# Patient Record
Sex: FEMALE | URBAN - METROPOLITAN AREA
[De-identification: names, ages, dates, MRNs, and addresses within clinical notes are randomized per-mention and may not be internally consistent; named-entity substitution may affect disease eponyms.]

---

## 2021-06-01 ENCOUNTER — APPOINTMENT (OUTPATIENT)
Dept: URBAN - METROPOLITAN AREA CLINIC 320 | Age: 51
Setting detail: DERMATOLOGY
End: 2021-06-03

## 2021-06-01 DIAGNOSIS — Z41.9 ENCOUNTER FOR PROCEDURE FOR PURPOSES OTHER THAN REMEDYING HEALTH STATE, UNSPECIFIED: ICD-10-CM

## 2021-06-01 PROCEDURE — OTHER TEOSYAL RHA 2 INJECTION: OTHER

## 2021-06-01 PROCEDURE — OTHER PRESCRIPTION: OTHER

## 2021-06-01 RX ORDER — VALACYCLOVIR HYDROCHLORIDE 500 MG/1
500MG TABLET, FILM COATED ORAL
Qty: 10 | Refills: 1 | COMMUNITY
Start: 2021-06-01

## 2021-06-03 ENCOUNTER — APPOINTMENT (OUTPATIENT)
Dept: URBAN - METROPOLITAN AREA CLINIC 320 | Age: 51
Setting detail: DERMATOLOGY
End: 2021-06-03

## 2021-06-03 DIAGNOSIS — Z41.9 ENCOUNTER FOR PROCEDURE FOR PURPOSES OTHER THAN REMEDYING HEALTH STATE, UNSPECIFIED: ICD-10-CM

## 2021-06-03 PROCEDURE — OTHER COSMETIC FOLLOW-UP: OTHER

## 2021-06-03 NOTE — PROCEDURE: COSMETIC FOLLOW-UP
Detail Level: Detailed
Price (Use Numbers Only, No Special Characters Or $): 0
Comments (Free Text): Patient was treated 2 days ago with RHA2 on lips. Patient verbalized that her right upper lip is slightly larger than her left upper.\\n\\nPost treatment photos taken and reviewed the right upper lips is slightly different from the left. I massaged all treated sites. Explained to patient to wait one week and retake photos may need more product. However the slight difference may not make a big difference if that’s the way her lips are originally.\\n\\nPatient verbalized understanding.

## 2021-06-10 ENCOUNTER — APPOINTMENT (OUTPATIENT)
Dept: URBAN - METROPOLITAN AREA CLINIC 320 | Age: 51
Setting detail: DERMATOLOGY
End: 2021-06-17

## 2021-06-10 DIAGNOSIS — Z41.9 ENCOUNTER FOR PROCEDURE FOR PURPOSES OTHER THAN REMEDYING HEALTH STATE, UNSPECIFIED: ICD-10-CM

## 2021-06-10 PROCEDURE — OTHER TEOSYAL RHA 2 INJECTION: OTHER

## 2021-06-15 ENCOUNTER — APPOINTMENT (OUTPATIENT)
Dept: URBAN - METROPOLITAN AREA CLINIC 320 | Age: 51
Setting detail: DERMATOLOGY
End: 2021-06-17

## 2021-06-15 DIAGNOSIS — Z41.9 ENCOUNTER FOR PROCEDURE FOR PURPOSES OTHER THAN REMEDYING HEALTH STATE, UNSPECIFIED: ICD-10-CM

## 2021-06-15 PROCEDURE — OTHER TEOSYAL RHA 2 INJECTION: OTHER

## 2021-06-15 ASSESSMENT — LOCATION DETAILED DESCRIPTION DERM: LOCATION DETAILED: RIGHT FOREHEAD

## 2021-06-15 ASSESSMENT — LOCATION SIMPLE DESCRIPTION DERM: LOCATION SIMPLE: RIGHT FOREHEAD

## 2021-06-15 ASSESSMENT — LOCATION ZONE DERM: LOCATION ZONE: FACE

## 2022-06-24 ENCOUNTER — APPOINTMENT (OUTPATIENT)
Dept: URBAN - METROPOLITAN AREA CLINIC 320 | Age: 52
Setting detail: DERMATOLOGY
End: 2022-06-24

## 2022-06-24 DIAGNOSIS — Z41.9 ENCOUNTER FOR PROCEDURE FOR PURPOSES OTHER THAN REMEDYING HEALTH STATE, UNSPECIFIED: ICD-10-CM

## 2022-06-24 PROCEDURE — OTHER FILLERS: OTHER

## 2022-06-24 PROCEDURE — OTHER PHOTO-DOCUMENTATION: OTHER

## 2022-06-24 PROCEDURE — OTHER PRESCRIPTION: OTHER

## 2022-06-24 PROCEDURE — OTHER ADDITIONAL NOTES: OTHER

## 2022-06-24 RX ORDER — FLUOCINOLONE ACETONIDE, HYDROQUINONE, AND TRETINOIN .1; 40; .5 MG/G; MG/G; MG/G
CREAM TOPICAL
Qty: 30 | Refills: 1 | Status: ERX | COMMUNITY
Start: 2022-06-24

## 2022-06-24 NOTE — PROCEDURE: ADDITIONAL NOTES
Render Risk Assessment In Note?: no
Additional Notes: Melasma.  Will write for compounded triluma once daily until clear, no longer than 3 months
Detail Level: Simple

## 2022-06-24 NOTE — PROCEDURE: FILLERS
Filler Comments: Amount: 2 syringe total, 1 syringe per side\\nLocation:  bilateral nasolabial fold and marionette